# Patient Record
Sex: MALE | Race: WHITE | NOT HISPANIC OR LATINO | Employment: FULL TIME | URBAN - NONMETROPOLITAN AREA
[De-identification: names, ages, dates, MRNs, and addresses within clinical notes are randomized per-mention and may not be internally consistent; named-entity substitution may affect disease eponyms.]

---

## 2022-01-20 ENCOUNTER — APPOINTMENT (OUTPATIENT)
Dept: GENERAL RADIOLOGY | Facility: HOSPITAL | Age: 47
End: 2022-01-20

## 2022-01-20 ENCOUNTER — APPOINTMENT (OUTPATIENT)
Dept: CT IMAGING | Facility: HOSPITAL | Age: 47
End: 2022-01-20

## 2022-01-20 ENCOUNTER — HOSPITAL ENCOUNTER (EMERGENCY)
Facility: HOSPITAL | Age: 47
Discharge: LEFT BEFORE DISCHARGE | End: 2022-01-20
Attending: STUDENT IN AN ORGANIZED HEALTH CARE EDUCATION/TRAINING PROGRAM | Admitting: STUDENT IN AN ORGANIZED HEALTH CARE EDUCATION/TRAINING PROGRAM

## 2022-01-20 VITALS
HEART RATE: 109 BPM | SYSTOLIC BLOOD PRESSURE: 116 MMHG | TEMPERATURE: 98.2 F | OXYGEN SATURATION: 96 % | DIASTOLIC BLOOD PRESSURE: 79 MMHG | RESPIRATION RATE: 22 BRPM

## 2022-01-20 DIAGNOSIS — R07.9 ACUTE CHEST PAIN: ICD-10-CM

## 2022-01-20 DIAGNOSIS — U07.1 COVID-19: Primary | ICD-10-CM

## 2022-01-20 LAB
ALBUMIN SERPL-MCNC: 4.51 G/DL (ref 3.5–5.2)
ALBUMIN/GLOB SERPL: 1.4 G/DL
ALP SERPL-CCNC: 81 U/L (ref 39–117)
ALT SERPL W P-5'-P-CCNC: 22 U/L (ref 1–41)
ANION GAP SERPL CALCULATED.3IONS-SCNC: 12 MMOL/L (ref 5–15)
APTT PPP: 30.7 SECONDS (ref 25.5–35.4)
AST SERPL-CCNC: 24 U/L (ref 1–40)
BASOPHILS # BLD AUTO: 0.03 10*3/MM3 (ref 0–0.2)
BASOPHILS NFR BLD AUTO: 0.6 % (ref 0–1.5)
BILIRUB SERPL-MCNC: 0.5 MG/DL (ref 0–1.2)
BUN SERPL-MCNC: 12 MG/DL (ref 6–20)
BUN/CREAT SERPL: 10.5 (ref 7–25)
CALCIUM SPEC-SCNC: 9.2 MG/DL (ref 8.6–10.5)
CHLORIDE SERPL-SCNC: 101 MMOL/L (ref 98–107)
CO2 SERPL-SCNC: 23 MMOL/L (ref 22–29)
CREAT SERPL-MCNC: 1.14 MG/DL (ref 0.76–1.27)
CRP SERPL-MCNC: 0.46 MG/DL (ref 0–0.5)
D DIMER PPP FEU-MCNC: 0.39 MCGFEU/ML (ref 0–0.5)
DEPRECATED RDW RBC AUTO: 39.5 FL (ref 37–54)
EOSINOPHIL # BLD AUTO: 0.03 10*3/MM3 (ref 0–0.4)
EOSINOPHIL NFR BLD AUTO: 0.6 % (ref 0.3–6.2)
ERYTHROCYTE [DISTWIDTH] IN BLOOD BY AUTOMATED COUNT: 12.2 % (ref 12.3–15.4)
ERYTHROCYTE [SEDIMENTATION RATE] IN BLOOD: 9 MM/HR (ref 0–15)
FERRITIN SERPL-MCNC: 253.2 NG/ML (ref 30–400)
FLUAV SUBTYP SPEC NAA+PROBE: NOT DETECTED
FLUBV RNA ISLT QL NAA+PROBE: NOT DETECTED
GFR SERPL CREATININE-BSD FRML MDRD: 69 ML/MIN/1.73
GLOBULIN UR ELPH-MCNC: 3.2 GM/DL
GLUCOSE SERPL-MCNC: 96 MG/DL (ref 65–99)
HCT VFR BLD AUTO: 44.4 % (ref 37.5–51)
HGB BLD-MCNC: 15.1 G/DL (ref 13–17.7)
IMM GRANULOCYTES # BLD AUTO: 0.02 10*3/MM3 (ref 0–0.05)
IMM GRANULOCYTES NFR BLD AUTO: 0.4 % (ref 0–0.5)
INR PPP: 1.02 (ref 0.9–1.1)
LDH SERPL-CCNC: 170 U/L (ref 135–225)
LYMPHOCYTES # BLD AUTO: 0.38 10*3/MM3 (ref 0.7–3.1)
LYMPHOCYTES NFR BLD AUTO: 7.1 % (ref 19.6–45.3)
MAGNESIUM SERPL-MCNC: 1.8 MG/DL (ref 1.6–2.6)
MCH RBC QN AUTO: 30.2 PG (ref 26.6–33)
MCHC RBC AUTO-ENTMCNC: 34 G/DL (ref 31.5–35.7)
MCV RBC AUTO: 88.8 FL (ref 79–97)
MONOCYTES # BLD AUTO: 0.81 10*3/MM3 (ref 0.1–0.9)
MONOCYTES NFR BLD AUTO: 15.2 % (ref 5–12)
NEUTROPHILS NFR BLD AUTO: 4.05 10*3/MM3 (ref 1.7–7)
NEUTROPHILS NFR BLD AUTO: 76.1 % (ref 42.7–76)
NRBC BLD AUTO-RTO: 0 /100 WBC (ref 0–0.2)
NT-PROBNP SERPL-MCNC: 21 PG/ML (ref 0–450)
PLATELET # BLD AUTO: 169 10*3/MM3 (ref 140–450)
PMV BLD AUTO: 10.6 FL (ref 6–12)
POTASSIUM SERPL-SCNC: 4 MMOL/L (ref 3.5–5.2)
PROT SERPL-MCNC: 7.7 G/DL (ref 6–8.5)
PROTHROMBIN TIME: 13.8 SECONDS (ref 12.8–14.5)
QT INTERVAL: 304 MS
QTC INTERVAL: 431 MS
RBC # BLD AUTO: 5 10*6/MM3 (ref 4.14–5.8)
SARS-COV-2 RNA PNL SPEC NAA+PROBE: DETECTED
SODIUM SERPL-SCNC: 136 MMOL/L (ref 136–145)
T4 FREE SERPL-MCNC: 1.13 NG/DL (ref 0.93–1.7)
TROPONIN T SERPL-MCNC: <0.01 NG/ML (ref 0–0.03)
TROPONIN T SERPL-MCNC: <0.01 NG/ML (ref 0–0.03)
TSH SERPL DL<=0.05 MIU/L-ACNC: 1.06 UIU/ML (ref 0.27–4.2)
WBC NRBC COR # BLD: 5.32 10*3/MM3 (ref 3.4–10.8)

## 2022-01-20 PROCEDURE — 70450 CT HEAD/BRAIN W/O DYE: CPT

## 2022-01-20 PROCEDURE — 86140 C-REACTIVE PROTEIN: CPT | Performed by: STUDENT IN AN ORGANIZED HEALTH CARE EDUCATION/TRAINING PROGRAM

## 2022-01-20 PROCEDURE — 82728 ASSAY OF FERRITIN: CPT | Performed by: STUDENT IN AN ORGANIZED HEALTH CARE EDUCATION/TRAINING PROGRAM

## 2022-01-20 PROCEDURE — 80050 GENERAL HEALTH PANEL: CPT | Performed by: STUDENT IN AN ORGANIZED HEALTH CARE EDUCATION/TRAINING PROGRAM

## 2022-01-20 PROCEDURE — 25010000002 DIPHENHYDRAMINE PER 50 MG: Performed by: STUDENT IN AN ORGANIZED HEALTH CARE EDUCATION/TRAINING PROGRAM

## 2022-01-20 PROCEDURE — 96374 THER/PROPH/DIAG INJ IV PUSH: CPT

## 2022-01-20 PROCEDURE — 0 IOPAMIDOL PER 1 ML: Performed by: STUDENT IN AN ORGANIZED HEALTH CARE EDUCATION/TRAINING PROGRAM

## 2022-01-20 PROCEDURE — 87636 SARSCOV2 & INF A&B AMP PRB: CPT | Performed by: STUDENT IN AN ORGANIZED HEALTH CARE EDUCATION/TRAINING PROGRAM

## 2022-01-20 PROCEDURE — 96375 TX/PRO/DX INJ NEW DRUG ADDON: CPT

## 2022-01-20 PROCEDURE — 85379 FIBRIN DEGRADATION QUANT: CPT | Performed by: STUDENT IN AN ORGANIZED HEALTH CARE EDUCATION/TRAINING PROGRAM

## 2022-01-20 PROCEDURE — 85652 RBC SED RATE AUTOMATED: CPT | Performed by: STUDENT IN AN ORGANIZED HEALTH CARE EDUCATION/TRAINING PROGRAM

## 2022-01-20 PROCEDURE — 85730 THROMBOPLASTIN TIME PARTIAL: CPT | Performed by: STUDENT IN AN ORGANIZED HEALTH CARE EDUCATION/TRAINING PROGRAM

## 2022-01-20 PROCEDURE — 84484 ASSAY OF TROPONIN QUANT: CPT | Performed by: STUDENT IN AN ORGANIZED HEALTH CARE EDUCATION/TRAINING PROGRAM

## 2022-01-20 PROCEDURE — 93005 ELECTROCARDIOGRAM TRACING: CPT | Performed by: STUDENT IN AN ORGANIZED HEALTH CARE EDUCATION/TRAINING PROGRAM

## 2022-01-20 PROCEDURE — 71045 X-RAY EXAM CHEST 1 VIEW: CPT

## 2022-01-20 PROCEDURE — 83615 LACTATE (LD) (LDH) ENZYME: CPT | Performed by: STUDENT IN AN ORGANIZED HEALTH CARE EDUCATION/TRAINING PROGRAM

## 2022-01-20 PROCEDURE — 83735 ASSAY OF MAGNESIUM: CPT | Performed by: STUDENT IN AN ORGANIZED HEALTH CARE EDUCATION/TRAINING PROGRAM

## 2022-01-20 PROCEDURE — 85610 PROTHROMBIN TIME: CPT | Performed by: STUDENT IN AN ORGANIZED HEALTH CARE EDUCATION/TRAINING PROGRAM

## 2022-01-20 PROCEDURE — 84439 ASSAY OF FREE THYROXINE: CPT | Performed by: STUDENT IN AN ORGANIZED HEALTH CARE EDUCATION/TRAINING PROGRAM

## 2022-01-20 PROCEDURE — 83880 ASSAY OF NATRIURETIC PEPTIDE: CPT | Performed by: STUDENT IN AN ORGANIZED HEALTH CARE EDUCATION/TRAINING PROGRAM

## 2022-01-20 PROCEDURE — 99283 EMERGENCY DEPT VISIT LOW MDM: CPT

## 2022-01-20 PROCEDURE — 71275 CT ANGIOGRAPHY CHEST: CPT

## 2022-01-20 RX ORDER — LABETALOL HYDROCHLORIDE 5 MG/ML
20 INJECTION, SOLUTION INTRAVENOUS ONCE
Status: COMPLETED | OUTPATIENT
Start: 2022-01-20 | End: 2022-01-20

## 2022-01-20 RX ORDER — DIPHENHYDRAMINE HYDROCHLORIDE 50 MG/ML
25 INJECTION INTRAMUSCULAR; INTRAVENOUS ONCE
Status: COMPLETED | OUTPATIENT
Start: 2022-01-20 | End: 2022-01-20

## 2022-01-20 RX ORDER — PROCHLORPERAZINE EDISYLATE 5 MG/ML
5 INJECTION INTRAMUSCULAR; INTRAVENOUS EVERY 6 HOURS PRN
Status: DISCONTINUED | OUTPATIENT
Start: 2022-01-20 | End: 2022-01-20 | Stop reason: HOSPADM

## 2022-01-20 RX ORDER — ASPIRIN 81 MG/1
324 TABLET, CHEWABLE ORAL ONCE
Status: COMPLETED | OUTPATIENT
Start: 2022-01-20 | End: 2022-01-20

## 2022-01-20 RX ORDER — NITROGLYCERIN 0.4 MG/1
0.4 TABLET SUBLINGUAL
Status: DISCONTINUED | OUTPATIENT
Start: 2022-01-20 | End: 2022-01-20 | Stop reason: HOSPADM

## 2022-01-20 RX ORDER — DIPHENHYDRAMINE HYDROCHLORIDE 50 MG/ML
25 INJECTION INTRAMUSCULAR; INTRAVENOUS ONCE
Status: DISCONTINUED | OUTPATIENT
Start: 2022-01-20 | End: 2022-01-20 | Stop reason: HOSPADM

## 2022-01-20 RX ORDER — PROCHLORPERAZINE EDISYLATE 5 MG/ML
5 INJECTION INTRAMUSCULAR; INTRAVENOUS EVERY 6 HOURS PRN
Status: DISCONTINUED | OUTPATIENT
Start: 2022-01-20 | End: 2022-01-20 | Stop reason: SDUPTHER

## 2022-01-20 RX ORDER — ACETAMINOPHEN 500 MG
1000 TABLET ORAL ONCE
Status: COMPLETED | OUTPATIENT
Start: 2022-01-20 | End: 2022-01-20

## 2022-01-20 RX ADMIN — ASPIRIN 324 MG: 81 TABLET, CHEWABLE ORAL at 00:42

## 2022-01-20 RX ADMIN — DIPHENHYDRAMINE HYDROCHLORIDE 25 MG: 50 INJECTION INTRAMUSCULAR; INTRAVENOUS at 03:29

## 2022-01-20 RX ADMIN — ACETAMINOPHEN 1000 MG: 500 TABLET ORAL at 03:25

## 2022-01-20 RX ADMIN — NITROGLYCERIN 2 INCH: 20 OINTMENT TOPICAL at 00:42

## 2022-01-20 RX ADMIN — IOPAMIDOL 80 ML: 755 INJECTION, SOLUTION INTRAVENOUS at 02:01

## 2022-01-20 RX ADMIN — LABETALOL HYDROCHLORIDE 10 MG: 5 INJECTION INTRAVENOUS at 01:45

## 2022-01-20 NOTE — ED NOTES
"Pt states that since he cannot have visitors upstairs later with the COVID, he would like to leave and go home. Explained to pt the risks and benefits of staying vs going home. Pt states he would still like to go home and \"just have the stress tests scheduled for later\". Pt A&O X4. VSS. RR even and unlabored. Dr Arcos and Lead RN, Ivy taylor.      Rubi Howe, RN  01/20/22 0446    "

## 2022-01-20 NOTE — ED NOTES
"Pt states he would like to leave AMA, pt states he just wants to go home and rest in his own bed and have the stress test \"scheduled for later\". Dr Sibley speaks with pt about the risks and benefits of staying vs leaving. Pt decided to stay and continue treatment.      Rubi Howe, RN  01/20/22 0449    "

## 2022-01-20 NOTE — ED PROVIDER NOTES
[facility]  emergency department encounter        Pt Name: Hussein Alcantara  MRN: [id number]  Birthdate 1975  Date of evaluation: 1/21/2022    Chief Complaint   Patient presents with   • Chest Pain             HISTORY OF PRESENT ILLNESS:   Hussein Alcantara is a 46 y.o. male PMH HTN, HLD, unspecified childhood aortic heart valve abnormality he reports his worsened and adulthood.  Patient had prior cardiologist but stopped following up, has not taken any medications in years.    Patient presents for acute onset chest pain a couple hours prior to arrival.  Described as pressure radiating throughout his chest, constant, exacerbated with exertion, associated shortness of breath, associated nausea with episode of vomiting, diaphoretic and tachycardic on arrival.  Endorses headache.  Patient otherwise broadly denies complaints.    Reports remote history of catheterization and stress test estimated 5 years ago, no reported abnormalities.  No stents.      No other exacerbating or alleviating factors other than as noted above.  Severity: Severe    PCP: Provider, No Known          REVIEW OF SYSTEMS:     Review of Systems   Constitutional: Positive for diaphoresis. Negative for fever.   HENT: Negative for congestion and rhinorrhea.    Eyes: Negative for visual disturbance.   Respiratory: Positive for shortness of breath. Negative for cough.    Cardiovascular: Positive for chest pain.   Gastrointestinal: Positive for nausea and vomiting. Negative for abdominal pain.   Genitourinary: Negative for dysuria.   Musculoskeletal: Negative for myalgias.   Skin: Negative for rash.   Neurological: Positive for headaches.   Psychiatric/Behavioral: Negative for confusion.       Review of systems otherwise as per HPI.          PREVIOUS HISTORY:       No past medical history on file.      No past surgical history on file.        Social History     Socioeconomic History   • Marital status: Single         No family history on  file.      No current outpatient medications      Allergies:  Patient has no known allergies.    Medications, allergies and past medical, surgical, family, and social history reviewed.        PHYSICAL EXAM:     Physical Exam  Vitals and nursing note reviewed.   Constitutional:       General: He is not in acute distress.     Appearance: Normal appearance. He is ill-appearing and diaphoretic. He is not toxic-appearing.   HENT:      Head: Normocephalic and atraumatic.   Eyes:      Extraocular Movements: Extraocular movements intact.      Conjunctiva/sclera: Conjunctivae normal.   Cardiovascular:      Rate and Rhythm: Regular rhythm. Tachycardia present.   Pulmonary:      Effort: Pulmonary effort is normal. No respiratory distress.      Breath sounds: No stridor. No wheezing, rhonchi or rales.   Abdominal:      General: Abdomen is flat. There is no distension.   Musculoskeletal:         General: No deformity. Normal range of motion.      Cervical back: Normal range of motion. No rigidity.   Neurological:      General: No focal deficit present.      Mental Status: He is alert and oriented to person, place, and time.   Psychiatric:         Mood and Affect: Mood normal.         Behavior: Behavior normal.             COMPLETED DIAGNOSTIC STUDIES AND INTERVENTIONS:     Lab Results (last 24 hours)     ** No results found for the last 24 hours. **            CT Angiogram Chest   Final Result      1. Suboptimal opacification of the pulmonary arteries. No large filling defects in the first or second order pulmonary arteries.   2. Negative for thoracic aortic aneurysm/dissection.   3. No acute pulmonary process.      Signer Name: Fredy Rojas MD    Signed: 1/20/2022 2:19 AM    Workstation Name: [email or web address]     [facility]      CT Head Without Contrast   Final Result   No acute intracranial abnormality.               Signer Name: Fredy Rojas MD    Signed: 1/20/2022 2:16 AM    Workstation Name:  [facility]     [facility]      XR Chest 1 View   Final Result      1. Low lung volumes.   2. Borderline heart size with mild central vascular congestion. No brenda pulmonary edema, but correlation for any symptoms of congestive failure recommended.      Signer Name: Fredy Rojas MD    Signed: 1/20/2022 12:58 AM    [facility]: [facility]     [facility]            New Medications Ordered This Visit   Medications   • aspirin chewable tablet 324 mg   • nitroglycerin (NITROSTAT) ointment 2 inch   • labetalol (NORMODYNE,TRANDATE) injection 20 mg   • iopamidol (ISOVUE-370) 76 % injection 100 mL   • acetaminophen (TYLENOL) tablet 1,000 mg   • diphenhydrAMINE (BENADRYL) injection 25 mg         Procedures            MEDICAL DECISION-MAKING AND ED COURSE:     ED Course as of 01/21/22 0845   Thu Jan 20, 2022   0021 EKG at 0012 hours a.m. sinus tachycardia 121 bpm, , QRS 92, QTc 431, regular axis, Q waves in V2, V3, poor R wave progression.  No prior for comparison.  No ST deviation or significant T wave abnormalities concerning for acute ischemia. [KP]   0128 MDM: 46-year-old male with unspecified aortic valve abnormality, HTN, HLD, noncompliant with medications and follow-up presents complaining of chest pain highly concerning for ACS.  Patient brought back to room emergently, administered aspirin, nitro, start fluid bolus.  EKG no evidence of ischemia. [KP]   0136 XR Chest 1 View  IMPRESSION:     1. Low lung volumes.  2. Borderline heart size with mild central vascular congestion. No bernda pulmonary edema, but correlation for any symptoms of congestive failure recommended. [KP]   0159 Troponin T: <0.010  Heart score 5 [KP]   0159 COVID19(!!): Detected [KP]   0159 WBC: 5.32 [KP]   0159 C-Reactive Protein: 0.46 [KP]   0159 Inflammatory markers reassuring, low concern for bacterial section [KP]   0159 Sed Rate: 9 [KP]   0200 Handoff to Dr. Arcos: 46-year-old  male PMH HTN, HLD, aortic valve disease, noncompliant with medications and follow-up presents with acute onset chest pain/pressure couple hours prior to arrival, highly concerning for ACS, associated shortness of breath, nausea, vomiting, diaphoretic on arrival, tachycardic.  +Headache.  No other respiratory complaints, COVID positive.  Heart score 5.  Echo ordered for the morning.  Final troponin 5 AM, no chest pain improved recommend stress test prior to discharge. [KP]   0228 D-Dimer, Quant: 0.39 [KP]   0229 CT head, CTA chest no acute findings. [KP]   0250 Complains of headache, Compazine Benadryl ordered.  Updated patient on plans. [KP]   Fri Jan 21, 2022   0839 Troponin T: <0.010 [KP]   0840 Pt discharged AMA by overnight provider. I held multiple discussions earlier in the night prior to ending my shift. Pt had clear understanding that his chest pain could very likely be from heart issues and that he was high risk for a heart attack. He expressed appreciation for the possible risk of leaving AMA including death. Pt clearly and consistently expressed his choice, reasoning, and understanding during those discussions. [KP]      ED Course User Index  [KP] Terry Sibley MD       ?      FINAL IMPRESSION:       1. COVID-19    2. Acute chest pain         The complaints listed here are new problems to this examiner.      FOLLOW-UP  No follow-up provider specified.    DISPOSITION  ED Disposition     ED Disposition Condition Comment    AMA                  This care is provided during an unprecedented national emergency due to the Novel Coronavirus (COVID-19). COVID-19 infections and transmission risks place heavy strains on healthcare resources. As this pandemic evolves, the Hospital and providers strive to respond fluidly, to remain operational, and to provide care relative to available resources and information. Outcomes are unpredictable and treatments are without well-defined guidelines. Further, the impact of  COVID-19 on all aspects of emergency care, including the impact to patients seeking care for reasons other than COVID-19, is unavoidable during this national emergency.    This note was dictated using a [id number] tool. Occasional wrong-word or 'sound-a-like' substitutions may have occurred due to the inherent limitations of voice recognition software. ?Read the chart carefully and recognize, using context, where substitutions have occurred.    Terry Sibley MD  08:44 EST  1/21/2022             Terry Sibley MD  01/21/22 0845

## 2022-01-20 NOTE — ED NOTES
"Pt states he would like to leave AMA, pt states he just wants to go home and rest in his own bed and have the stress test \"scheduled for later\". Dr Sibley speaks with pt about the risks and benefits of staying vs leaving. Pt decided to stay and continue treatment.      Rubi Howe, RN  01/20/22 0448    "

## 2022-03-14 ENCOUNTER — TRANSCRIBE ORDERS (OUTPATIENT)
Dept: ADMINISTRATIVE | Facility: HOSPITAL | Age: 47
End: 2022-03-14

## 2022-03-14 ENCOUNTER — LAB (OUTPATIENT)
Dept: LAB | Facility: HOSPITAL | Age: 47
End: 2022-03-14

## 2022-03-14 DIAGNOSIS — N46.9 INFERTILITY MALE: ICD-10-CM

## 2022-03-14 DIAGNOSIS — N46.9 INFERTILITY MALE: Primary | ICD-10-CM

## 2022-03-14 LAB
CHARACTER SMN: ABNORMAL
COLOR SMN: ABNORMAL
FORWARD PROGRESSION: ABNORMAL
ROUND CELLS, SEMINAL FLUID: ABNORMAL /HPF
SPECIMEN VOL SMN: 1.5 ML (ref 2–5)
SPERM # SMN: 7.4 MILLIONS/ML (ref 20–999)
SPERM MOTILE NFR SMN: 7 % MOTILE (ref 41–100)
SPERM PRECURSORS/100 SPERM: 3 /100 SPERM
SPERM SMN: 18 % NORMAL (ref 30–100)
VIABLE CELLS NFR SPEC: 4 % VIABLE (ref 51–100)
VISC SMN: NORMAL CP
WBC/100 SPERM: 2 /100 SPERM

## 2022-03-14 PROCEDURE — 89320 SEMEN ANAL VOL/COUNT/MOT: CPT

## 2022-05-24 ENCOUNTER — OFFICE VISIT (OUTPATIENT)
Dept: UROLOGY | Facility: CLINIC | Age: 47
End: 2022-05-24

## 2022-05-24 VITALS — HEIGHT: 70 IN | BODY MASS INDEX: 35.79 KG/M2 | WEIGHT: 250 LBS

## 2022-05-24 DIAGNOSIS — N40.0 BENIGN PROSTATIC HYPERPLASIA WITHOUT LOWER URINARY TRACT SYMPTOMS: Primary | ICD-10-CM

## 2022-05-24 PROCEDURE — 99203 OFFICE O/P NEW LOW 30 MIN: CPT | Performed by: UROLOGY

## 2022-05-24 RX ORDER — LEVOFLOXACIN 500 MG/1
TABLET, FILM COATED ORAL
COMMUNITY
Start: 2022-04-20

## 2022-05-24 RX ORDER — ATORVASTATIN CALCIUM 10 MG/1
TABLET, FILM COATED ORAL
COMMUNITY
Start: 2022-04-20

## 2022-05-24 RX ORDER — LISINOPRIL 20 MG/1
20 TABLET ORAL
COMMUNITY
Start: 2022-04-20

## 2022-05-24 RX ORDER — TAMSULOSIN HYDROCHLORIDE 0.4 MG/1
1 CAPSULE ORAL NIGHTLY
Qty: 30 CAPSULE | Refills: 5 | Status: SHIPPED | OUTPATIENT
Start: 2022-05-24 | End: 2022-07-25 | Stop reason: SDUPTHER

## 2022-05-24 NOTE — PROGRESS NOTES
Chief Complaint:          Chief Complaint   Patient presents with   • Testicle Pain     New patient        HPI:   47 y.o. male referred for evaluation of perineal/testicular pain.  He is accompanied by his wife.  He has testalgia.  Psych sitting on an egg but not at the head of the penis he reports no lower urinary tract symptomatology, particularly irritative symptoms such as frequency, urgency, dysuria, and obstructive symptomatology, particularly dribbling, hesitancy, and intermittency.  He was treated with an antibiotic for 7 days without effects.  He had a negative ultrasound is worse in the morning he has a poor stream.  He has an uncircumcised phallus with less than 10 g prostate he has had a cholecystectomy.  Exam is entirely unremarkable including a digital rectal examination showing a small prostate and no prostatitis I Dot start him on alpha blockade see him back in 2 months      Past Medical History:      History reviewed. No pertinent past medical history.      Current Meds:     Current Outpatient Medications   Medication Sig Dispense Refill   • atorvastatin (LIPITOR) 10 MG tablet      • levoFLOXacin (LEVAQUIN) 500 MG tablet      • lisinopril (PRINIVIL,ZESTRIL) 20 MG tablet 20 mg.       No current facility-administered medications for this visit.        Allergies:      No Known Allergies     Past Surgical History:     History reviewed. No pertinent surgical history.      Social History:     Social History     Socioeconomic History   • Marital status:        Family History:     History reviewed. No pertinent family history.    Review of Systems:     Review of Systems   Constitutional: Negative.    HENT: Negative.    Eyes: Negative.    Respiratory: Negative.    Cardiovascular: Negative.    Gastrointestinal: Negative.    Endocrine: Negative.    Genitourinary: Positive for difficulty urinating, frequency and testicular pain.   Musculoskeletal: Negative.    Allergic/Immunologic: Negative.     Neurological: Negative.    Hematological: Negative.    Psychiatric/Behavioral: Negative.        Physical Exam:     Physical Exam  Vitals and nursing note reviewed.   Constitutional:       Appearance: He is well-developed.   HENT:      Head: Normocephalic and atraumatic.   Eyes:      Conjunctiva/sclera: Conjunctivae normal.      Pupils: Pupils are equal, round, and reactive to light.   Cardiovascular:      Rate and Rhythm: Normal rate and regular rhythm.      Heart sounds: Normal heart sounds.   Pulmonary:      Effort: Pulmonary effort is normal.      Breath sounds: Normal breath sounds.   Abdominal:      General: Bowel sounds are normal.      Palpations: Abdomen is soft.   Genitourinary:     Comments: Obese normal phallus bilateral descended testes without masses good rectal tone and a small smooth firm prostate  Musculoskeletal:         General: Normal range of motion.      Cervical back: Normal range of motion.   Skin:     General: Skin is warm and dry.   Neurological:      Mental Status: He is alert and oriented to person, place, and time.      Deep Tendon Reflexes: Reflexes are normal and symmetric.   Psychiatric:         Behavior: Behavior normal.         Thought Content: Thought content normal.         Judgment: Judgment normal.         I have reviewed the following portions of the patient's history: Allergies, current medications, past family history, past medical history, past social history, past surgical history, problem list, and ROS and confirm it is accurate.      Procedure:       Assessment/Plan:   BPH: Discussed the pathophysiology of BPH and obstruction.  We discussed the static and dynamic effects of BPH as well as using 5 alpha reductase inhibitors versus alpha blockade.  We discussed the indications for transurethral surgery as well and/ or other therapeutic options available including all of the newer techniques.  Has significant voiding dysfunction I Dot try him on a course of Flomax  His  prostate is normal to palpation very small I do not think this is probably related to prostatitis but more related to back pain with referred testalgia          This document has been electronically signed by SHAHIDA TALBOT MD May 24, 2022 13:17 EDT

## 2022-05-25 PROBLEM — N40.0 BENIGN PROSTATIC HYPERPLASIA WITHOUT LOWER URINARY TRACT SYMPTOMS: Status: ACTIVE | Noted: 2022-05-25

## 2022-07-25 ENCOUNTER — OFFICE VISIT (OUTPATIENT)
Dept: UROLOGY | Facility: CLINIC | Age: 47
End: 2022-07-25

## 2022-07-25 VITALS — BODY MASS INDEX: 35.66 KG/M2 | WEIGHT: 249.12 LBS | HEIGHT: 70 IN

## 2022-07-25 DIAGNOSIS — N40.0 BENIGN PROSTATIC HYPERPLASIA WITHOUT LOWER URINARY TRACT SYMPTOMS: ICD-10-CM

## 2022-07-25 PROCEDURE — 99213 OFFICE O/P EST LOW 20 MIN: CPT | Performed by: UROLOGY

## 2022-07-25 RX ORDER — TADALAFIL 5 MG/1
5 TABLET ORAL DAILY
Qty: 30 TABLET | Refills: 6 | Status: SHIPPED | OUTPATIENT
Start: 2022-07-25

## 2022-07-25 RX ORDER — TAMSULOSIN HYDROCHLORIDE 0.4 MG/1
1 CAPSULE ORAL NIGHTLY
Qty: 90 CAPSULE | Refills: 5 | Status: SHIPPED | OUTPATIENT
Start: 2022-07-25 | End: 2023-01-26 | Stop reason: SDUPTHER

## 2022-07-25 NOTE — PROGRESS NOTES
Chief Complaint:      Chief Complaint   Patient presents with   • Benign Prostatic Hypertrophy       HPI:   47 y.o. male returns today.  He does not hurt as bad as he did so his prostatitis is doing better.  He is on alpha blockade.  It does somewhat adversely affect his ejaculatory function.  I will make the addition of Cialis because his erections are not as good as what they were we discussed aggressive options but given his age of 47 I do not recommend aggressive intervention I will see him back in 6 months    Past Medical History:     Past Medical History:   Diagnosis Date   • High blood pressure        Current Meds:     Current Outpatient Medications   Medication Sig Dispense Refill   • atorvastatin (LIPITOR) 10 MG tablet      • levoFLOXacin (LEVAQUIN) 500 MG tablet      • lisinopril (PRINIVIL,ZESTRIL) 20 MG tablet 20 mg.     • tamsulosin (Flomax) 0.4 MG capsule 24 hr capsule Take 1 capsule by mouth Every Night. 30 capsule 5     No current facility-administered medications for this visit.        Allergies:      No Known Allergies     Past Surgical History:     Past Surgical History:   Procedure Laterality Date   • CHOLECYSTECTOMY     • TONSILLECTOMY         Social History:     Social History     Socioeconomic History   • Marital status:    Tobacco Use   • Smoking status: Never Smoker   • Smokeless tobacco: Never Used   Substance and Sexual Activity   • Alcohol use: Not Currently   • Drug use: Never   • Sexual activity: Defer       Family History:     Family History   Problem Relation Age of Onset   • Heart disease Father    • Hypertension Father    • Hypertension Mother    • Heart disease Mother        Review of Systems:     Review of Systems   Constitutional: Negative.    HENT: Negative.    Eyes: Negative.    Respiratory: Negative.    Cardiovascular: Negative.    Gastrointestinal: Negative.    Endocrine: Negative.    Musculoskeletal: Negative.    Allergic/Immunologic: Negative.    Neurological:  Negative.    Hematological: Negative.    Psychiatric/Behavioral: Negative.        Physical Exam:     Physical Exam  Vitals and nursing note reviewed.   Constitutional:       Appearance: He is well-developed.   HENT:      Head: Normocephalic and atraumatic.   Eyes:      Conjunctiva/sclera: Conjunctivae normal.      Pupils: Pupils are equal, round, and reactive to light.   Cardiovascular:      Rate and Rhythm: Normal rate and regular rhythm.      Heart sounds: Normal heart sounds.   Pulmonary:      Effort: Pulmonary effort is normal.      Breath sounds: Normal breath sounds.   Abdominal:      General: Bowel sounds are normal.      Palpations: Abdomen is soft.   Musculoskeletal:         General: Normal range of motion.      Cervical back: Normal range of motion.   Skin:     General: Skin is warm and dry.   Neurological:      Mental Status: He is alert and oriented to person, place, and time.      Deep Tendon Reflexes: Reflexes are normal and symmetric.   Psychiatric:         Behavior: Behavior normal.         Thought Content: Thought content normal.         Judgment: Judgment normal.         I have reviewed the following portions of the patient's history: Allergies, current medications, past family history, past medical history, past social history, past surgical history, problem list, and ROS and confirm it is accurate.    Recent Image (CT and/or KUB):      CT Abdomen and Pelvis: No results found for this or any previous visit.       CT Stone Protocol: No results found for this or any previous visit.       KUB: No results found for this or any previous visit.       Labs (past 3 months):      No visits with results within 3 Month(s) from this visit.   Latest known visit with results is:   Lab on 03/14/2022   Component Date Value Ref Range Status   • Semen Color 03/14/2022 Straw (A) White, Gray Final   • Volume, Semen 03/14/2022 1.5 (A) 2.0 - 5.0 mL Final   • Viscosity 03/14/2022 Normal  Normal Final   • Motility  03/14/2022 7 (A) 41 - 100 % Motile Final   • Forward Progression 03/14/2022 1.0 (Abnormal) Some of the sperm are motile but none are showing any signs of forward motion.   Final   • Viability 03/14/2022 4 (A) 51 - 100 % Viable Final   • Sperm Morphology 03/14/2022 18.0 (A) 30.0 - 100.0 % Normal Final   • Sperm, Total Count 03/14/2022 7.4 (A) 20.0 - 999.0 Millions/mL Final   • Round Cells, Seminal Fluid 03/14/2022 0-7  0 - 7 /HPF Final   • WBC/100 sperm 03/14/2022 2  /100 Sperm Final   • Sperm Precursors/100 sperm 03/14/2022 3  /100 Sperm Final   • Character, Semen 03/14/2022 Translucent but turbid  Translucent but turbid Final        Procedure:       Assessment/Plan:   Prostatitis-we discussed the differential diagnosis of prostatitis including the National Institutes of Health classification system I through IV.  I discussed that type I prostatitis is acute bacterial prostatitis and associated with high fevers, typically hematuria, and severe pain with voiding.  We discussed the fact that it necessitates 6 weeks of chronic antibiotics to be sure it doesn't turn into a chronic bacterial prostatitis that can have lifelong ramifications.  We discussed National Institutes of Health type II chronic bacterial prostatitis.  We discussed the fact that this a chronic bacterial infection of the prostate that often requires suppressive antibiotics.  We discussed type III being related more to nonspecific urethritis, as well as tight for being related to finding inflammation and a biopsy in the absence of symptomatology.  I discussed the diagnostic strategies including the VB 1 through 4 urine culture and discussed empiric therapy.  I emphasized that with the use of chronic antibiotics, I strongly recommended the concomitant use of probiotics.  Additionally, we discussed the various antibiotics used and the risks and benefits associated with each, particularly the use of long-term ciprofloxacin and the effect on joints and  collagen in human beings.  His prostatitis has resolved  BPH: Discussed the pathophysiology of BPH and obstruction.  We discussed the static and dynamic effects of BPH as well as using 5 alpha reductase inhibitors versus alpha blockade.  We discussed the indications for transurethral surgery as well and/ or other therapeutic options available including all of the newer techniques.  Because of a somewhat slow stream I will make the addition of Cialis to his regimen of alpha blockade.  I do not think aggressive surgical intervention is indicated at this juncture given his young age  Erectile dysfunction-we discussed the anatomy and physiology of the penis and the endothelium.  We discussed the various forms of erectile dysfunction including peripheral vascular occlusive disease, postoperative, secondary to radiation treatments of the prostate, and arterial inflow.  We discussed the various treatment options available including oral medication and its various forms.  We discussed the use of both generic and non-generic Viagra.  We discussed Cialis and a longer half-life of 17 hours as well as the other 2 medications.  We discussed cost involved with this including the fact that the generic is much cheaper but is taken as multiple pills because they are 20 mg dosages.  We did discuss the other alternatives including penile injections, vacuum erection devices and surgical intervention reserved for only the most severe cases.  We discussed the need for testosterone in about 20% of cases of erectile dysfunction.  We will make the addition of Cialis as well              This document has been electronically signed by SHAHIDA TALBOT MD July 25, 2022 12:58 EDT    Dictated Utilizing Dragon Dictation: Part of this note may be an electronic transcription/translation of spoken language to printed text using the Dragon Dictation System.

## 2022-07-29 ENCOUNTER — TELEPHONE (OUTPATIENT)
Dept: UROLOGY | Facility: CLINIC | Age: 47
End: 2022-07-29

## 2023-01-26 ENCOUNTER — OFFICE VISIT (OUTPATIENT)
Dept: UROLOGY | Facility: CLINIC | Age: 48
End: 2023-01-26
Payer: COMMERCIAL

## 2023-01-26 VITALS
WEIGHT: 249 LBS | BODY MASS INDEX: 35.65 KG/M2 | HEIGHT: 70 IN | HEART RATE: 82 BPM | DIASTOLIC BLOOD PRESSURE: 102 MMHG | SYSTOLIC BLOOD PRESSURE: 172 MMHG

## 2023-01-26 DIAGNOSIS — N40.0 BENIGN PROSTATIC HYPERPLASIA WITHOUT LOWER URINARY TRACT SYMPTOMS: ICD-10-CM

## 2023-01-26 PROCEDURE — 82670 ASSAY OF TOTAL ESTRADIOL: CPT | Performed by: UROLOGY

## 2023-01-26 PROCEDURE — 99213 OFFICE O/P EST LOW 20 MIN: CPT | Performed by: UROLOGY

## 2023-01-26 PROCEDURE — 84403 ASSAY OF TOTAL TESTOSTERONE: CPT | Performed by: UROLOGY

## 2023-01-26 PROCEDURE — 85027 COMPLETE CBC AUTOMATED: CPT | Performed by: UROLOGY

## 2023-01-26 PROCEDURE — 84153 ASSAY OF PSA TOTAL: CPT | Performed by: UROLOGY

## 2023-01-26 RX ORDER — TADALAFIL 20 MG/1
20 TABLET ORAL AS NEEDED
Qty: 20 TABLET | Refills: 1 | Status: SHIPPED | OUTPATIENT
Start: 2023-01-26

## 2023-01-26 RX ORDER — TAMSULOSIN HYDROCHLORIDE 0.4 MG/1
1 CAPSULE ORAL NIGHTLY
Qty: 90 CAPSULE | Refills: 5 | Status: SHIPPED | OUTPATIENT
Start: 2023-01-26

## 2023-01-27 LAB
DEPRECATED RDW RBC AUTO: 38.6 FL (ref 37–54)
ERYTHROCYTE [DISTWIDTH] IN BLOOD BY AUTOMATED COUNT: 11.8 % (ref 12.3–15.4)
ESTRADIOL SERPL HS-MCNC: 36.7 PG/ML
HCT VFR BLD AUTO: 46.3 % (ref 37.5–51)
HGB BLD-MCNC: 16 G/DL (ref 13–17.7)
MCH RBC QN AUTO: 30.5 PG (ref 26.6–33)
MCHC RBC AUTO-ENTMCNC: 34.6 G/DL (ref 31.5–35.7)
MCV RBC AUTO: 88.2 FL (ref 79–97)
PLATELET # BLD AUTO: 200 10*3/MM3 (ref 140–450)
PMV BLD AUTO: 12.2 FL (ref 6–12)
PSA SERPL-MCNC: 1.11 NG/ML (ref 0–4)
RBC # BLD AUTO: 5.25 10*6/MM3 (ref 4.14–5.8)
TESTOST SERPL-MCNC: 208 NG/DL (ref 249–836)
WBC NRBC COR # BLD: 5.81 10*3/MM3 (ref 3.4–10.8)

## 2023-12-06 ENCOUNTER — TELEPHONE (OUTPATIENT)
Dept: GASTROENTEROLOGY | Facility: CLINIC | Age: 48
End: 2023-12-06
Payer: COMMERCIAL

## 2023-12-06 DIAGNOSIS — N40.0 BENIGN PROSTATIC HYPERPLASIA WITHOUT LOWER URINARY TRACT SYMPTOMS: ICD-10-CM

## 2023-12-06 RX ORDER — TADALAFIL 20 MG/1
20 TABLET ORAL AS NEEDED
Qty: 20 TABLET | Refills: 6 | Status: SHIPPED | OUTPATIENT
Start: 2023-12-06

## 2023-12-06 NOTE — TELEPHONE ENCOUNTER
Pharmacy: [facility] in [place]    Patient called in requesting refill on tadalafil to be sent to above pharmacy.

## 2024-01-17 ENCOUNTER — APPOINTMENT (OUTPATIENT)
Dept: CT IMAGING | Facility: HOSPITAL | Age: 49
End: 2024-01-17
Payer: COMMERCIAL

## 2024-01-17 ENCOUNTER — HOSPITAL ENCOUNTER (EMERGENCY)
Facility: HOSPITAL | Age: 49
Discharge: HOME | End: 2024-01-17
Attending: EMERGENCY MEDICINE | Admitting: EMERGENCY MEDICINE
Payer: COMMERCIAL

## 2024-01-17 VITALS
HEART RATE: 81 BPM | WEIGHT: 246 LBS | RESPIRATION RATE: 18 BRPM | BODY MASS INDEX: 35.22 KG/M2 | HEIGHT: 70 IN | OXYGEN SATURATION: 97 % | SYSTOLIC BLOOD PRESSURE: 133 MMHG | DIASTOLIC BLOOD PRESSURE: 99 MMHG | TEMPERATURE: 98.6 F

## 2024-01-17 DIAGNOSIS — R10.9 ABDOMINAL PAIN, UNSPECIFIED ABDOMINAL LOCATION: Primary | ICD-10-CM

## 2024-01-17 DIAGNOSIS — R11.2 NAUSEA AND VOMITING, UNSPECIFIED VOMITING TYPE: ICD-10-CM

## 2024-01-17 LAB
ALBUMIN SERPL-MCNC: 4.5 G/DL (ref 3.5–5.2)
ALBUMIN/GLOB SERPL: 1.1 G/DL
ALP SERPL-CCNC: 94 U/L (ref 39–117)
ALT SERPL W P-5'-P-CCNC: 19 U/L (ref 1–41)
ANION GAP SERPL CALCULATED.3IONS-SCNC: 8 MMOL/L (ref 5–15)
AST SERPL-CCNC: 20 U/L (ref 1–40)
BASOPHILS # BLD AUTO: 0.05 10*3/MM3 (ref 0–0.2)
BASOPHILS NFR BLD AUTO: 0.5 % (ref 0–1.5)
BILIRUB SERPL-MCNC: 0.7 MG/DL (ref 0–1.2)
BILIRUB UR QL STRIP: NEGATIVE
BUN SERPL-MCNC: 12 MG/DL (ref 6–20)
BUN/CREAT SERPL: 9.8 (ref 7–25)
CALCIUM SPEC-SCNC: 9.2 MG/DL (ref 8.6–10.5)
CHLORIDE SERPL-SCNC: 103 MMOL/L (ref 98–107)
CLARITY UR: CLEAR
CO2 SERPL-SCNC: 29 MMOL/L (ref 22–29)
COLOR UR: YELLOW
CREAT SERPL-MCNC: 1.22 MG/DL (ref 0.76–1.27)
DEPRECATED RDW RBC AUTO: 40.3 FL (ref 37–54)
EGFRCR SERPLBLD CKD-EPI 2021: 73.6 ML/MIN/1.73
EOSINOPHIL # BLD AUTO: 0.2 10*3/MM3 (ref 0–0.4)
EOSINOPHIL NFR BLD AUTO: 1.9 % (ref 0.3–6.2)
ERYTHROCYTE [DISTWIDTH] IN BLOOD BY AUTOMATED COUNT: 12.3 % (ref 12.3–15.4)
GLOBULIN UR ELPH-MCNC: 4 GM/DL
GLUCOSE SERPL-MCNC: 111 MG/DL (ref 65–99)
GLUCOSE UR STRIP-MCNC: NEGATIVE MG/DL
HCT VFR BLD AUTO: 52.4 % (ref 37.5–51)
HGB BLD-MCNC: 17.5 G/DL (ref 13–17.7)
HGB UR QL STRIP.AUTO: NEGATIVE
HOLD SPECIMEN: NORMAL
HOLD SPECIMEN: NORMAL
IMM GRANULOCYTES # BLD AUTO: 0.03 10*3/MM3 (ref 0–0.05)
IMM GRANULOCYTES NFR BLD AUTO: 0.3 % (ref 0–0.5)
KETONES UR QL STRIP: NEGATIVE
LEUKOCYTE ESTERASE UR QL STRIP.AUTO: NEGATIVE
LIPASE SERPL-CCNC: 26 U/L (ref 13–60)
LYMPHOCYTES # BLD AUTO: 2.18 10*3/MM3 (ref 0.7–3.1)
LYMPHOCYTES NFR BLD AUTO: 20.7 % (ref 19.6–45.3)
MCH RBC QN AUTO: 30.4 PG (ref 26.6–33)
MCHC RBC AUTO-ENTMCNC: 33.4 G/DL (ref 31.5–35.7)
MCV RBC AUTO: 91 FL (ref 79–97)
MONOCYTES # BLD AUTO: 0.93 10*3/MM3 (ref 0.1–0.9)
MONOCYTES NFR BLD AUTO: 8.8 % (ref 5–12)
NEUTROPHILS NFR BLD AUTO: 67.8 % (ref 42.7–76)
NEUTROPHILS NFR BLD AUTO: 7.14 10*3/MM3 (ref 1.7–7)
NITRITE UR QL STRIP: NEGATIVE
NRBC BLD AUTO-RTO: 0 /100 WBC (ref 0–0.2)
PH UR STRIP.AUTO: 5.5 [PH] (ref 5–8)
PLATELET # BLD AUTO: 238 10*3/MM3 (ref 140–450)
PMV BLD AUTO: 10.2 FL (ref 6–12)
POTASSIUM SERPL-SCNC: 4.1 MMOL/L (ref 3.5–5.2)
PROT SERPL-MCNC: 8.5 G/DL (ref 6–8.5)
PROT UR QL STRIP: NEGATIVE
RBC # BLD AUTO: 5.76 10*6/MM3 (ref 4.14–5.8)
SODIUM SERPL-SCNC: 140 MMOL/L (ref 136–145)
SP GR UR STRIP: >1.03 (ref 1–1.03)
UROBILINOGEN UR QL STRIP: ABNORMAL
WBC NRBC COR # BLD AUTO: 10.53 10*3/MM3 (ref 3.4–10.8)
WHOLE BLOOD HOLD COAG: NORMAL
WHOLE BLOOD HOLD SPECIMEN: NORMAL

## 2024-01-17 PROCEDURE — 99285 EMERGENCY DEPT VISIT HI MDM: CPT

## 2024-01-17 PROCEDURE — 80053 COMPREHEN METABOLIC PANEL: CPT | Performed by: PHYSICIAN ASSISTANT

## 2024-01-17 PROCEDURE — 25810000003 SODIUM CHLORIDE 0.9 % SOLUTION: Performed by: PHYSICIAN ASSISTANT

## 2024-01-17 PROCEDURE — 25510000001 IOPAMIDOL 61 % SOLUTION: Performed by: EMERGENCY MEDICINE

## 2024-01-17 PROCEDURE — 96374 THER/PROPH/DIAG INJ IV PUSH: CPT

## 2024-01-17 PROCEDURE — 85025 COMPLETE CBC W/AUTO DIFF WBC: CPT | Performed by: PHYSICIAN ASSISTANT

## 2024-01-17 PROCEDURE — 81003 URINALYSIS AUTO W/O SCOPE: CPT | Performed by: PHYSICIAN ASSISTANT

## 2024-01-17 PROCEDURE — 25010000002 MORPHINE PER 10 MG: Performed by: EMERGENCY MEDICINE

## 2024-01-17 PROCEDURE — 96375 TX/PRO/DX INJ NEW DRUG ADDON: CPT

## 2024-01-17 PROCEDURE — 83690 ASSAY OF LIPASE: CPT | Performed by: PHYSICIAN ASSISTANT

## 2024-01-17 PROCEDURE — 74177 CT ABD & PELVIS W/CONTRAST: CPT | Performed by: RADIOLOGY

## 2024-01-17 PROCEDURE — 74177 CT ABD & PELVIS W/CONTRAST: CPT

## 2024-01-17 PROCEDURE — 25010000002 ONDANSETRON PER 1 MG: Performed by: EMERGENCY MEDICINE

## 2024-01-17 RX ORDER — DICYCLOMINE HYDROCHLORIDE 10 MG/1
20 CAPSULE ORAL ONCE
Status: COMPLETED | OUTPATIENT
Start: 2024-01-17 | End: 2024-01-17

## 2024-01-17 RX ORDER — SODIUM CHLORIDE 0.9 % (FLUSH) 0.9 %
10 SYRINGE (ML) INJECTION AS NEEDED
Status: DISCONTINUED | OUTPATIENT
Start: 2024-01-17 | End: 2024-01-17 | Stop reason: HOSPADM

## 2024-01-17 RX ORDER — ONDANSETRON 4 MG/1
4 TABLET, ORALLY DISINTEGRATING ORAL EVERY 6 HOURS PRN
Qty: 12 TABLET | Refills: 0 | Status: SHIPPED | OUTPATIENT
Start: 2024-01-17

## 2024-01-17 RX ORDER — DICYCLOMINE HCL 20 MG
20 TABLET ORAL EVERY 6 HOURS PRN
Qty: 20 TABLET | Refills: 0 | Status: SHIPPED | OUTPATIENT
Start: 2024-01-17

## 2024-01-17 RX ORDER — ONDANSETRON 2 MG/ML
4 INJECTION INTRAMUSCULAR; INTRAVENOUS ONCE
Status: COMPLETED | OUTPATIENT
Start: 2024-01-17 | End: 2024-01-17

## 2024-01-17 RX ORDER — ONDANSETRON 2 MG/ML
8 INJECTION INTRAMUSCULAR; INTRAVENOUS ONCE
Status: DISCONTINUED | OUTPATIENT
Start: 2024-01-17 | End: 2024-01-17

## 2024-01-17 RX ADMIN — SODIUM CHLORIDE 1000 ML: 9 INJECTION, SOLUTION INTRAVENOUS at 16:06

## 2024-01-17 RX ADMIN — ONDANSETRON 4 MG: 2 INJECTION INTRAMUSCULAR; INTRAVENOUS at 16:49

## 2024-01-17 RX ADMIN — IOPAMIDOL 80 ML: 612 INJECTION, SOLUTION INTRAVENOUS at 16:57

## 2024-01-17 RX ADMIN — DICYCLOMINE HYDROCHLORIDE 20 MG: 10 CAPSULE ORAL at 17:55

## 2024-01-17 RX ADMIN — MORPHINE SULFATE 4 MG: 4 INJECTION, SOLUTION INTRAMUSCULAR; INTRAVENOUS at 16:46

## 2024-01-17 NOTE — ED PROVIDER NOTES
Subjective   History of Present Illness  48-year-old male who presents to the emergency department with chief complaint generalized abdominal pain.  Patient describes pain as aching, cramping pain.  Patient has had associated nausea vomiting.  Patient was seen by PCP told to come to the emergency department for further evaluation and treatment.    History provided by:  Patient   used: No    Abdominal Pain  Pain location:  Generalized  Pain quality: aching and cramping    Pain radiates to:  Does not radiate  Pain severity:  Moderate  Onset quality:  Gradual  Duration:  2 days  Timing:  Intermittent  Progression:  Worsening  Chronicity:  New  Context: not awakening from sleep, not diet changes, not laxative use, not previous surgeries, not recent illness, not recent sexual activity, not recent travel, not sick contacts and not suspicious food intake    Relieved by:  Nothing  Worsened by:  Nothing  Ineffective treatments:  None tried  Associated symptoms: chills, fatigue, nausea and vomiting    Associated symptoms: no flatus, no hematemesis, no hematochezia, no vaginal bleeding and no vaginal discharge    Risk factors: no alcohol abuse, no aspirin use, not elderly, has not had multiple surgeries, no NSAID use, not pregnant and no recent hospitalization        Review of Systems   Constitutional:  Positive for chills and fatigue.   Eyes:  Negative for photophobia, pain, redness and itching.   Gastrointestinal:  Positive for abdominal distention, abdominal pain, nausea and vomiting. Negative for flatus, hematemesis and hematochezia.   Endocrine: Negative for heat intolerance, polydipsia and polyuria.   Genitourinary:  Negative for vaginal bleeding and vaginal discharge.   All other systems reviewed and are negative.      Past Medical History:   Diagnosis Date    Erectile dysfunction     High blood pressure        Allergies   Allergen Reactions    Prevacid [Lansoprazole] Rash       Past Surgical  History:   Procedure Laterality Date    CHOLECYSTECTOMY      TONSILLECTOMY         Family History   Problem Relation Age of Onset    Heart disease Father     Hypertension Father     Hypertension Mother     Heart disease Mother        Social History     Socioeconomic History    Marital status:    Tobacco Use    Smoking status: Never    Smokeless tobacco: Never   Substance and Sexual Activity    Alcohol use: Not Currently    Drug use: Never    Sexual activity: Yes     Partners: Female     Birth control/protection: Same-sex partner           Objective   Physical Exam  Vitals and nursing note reviewed.   Constitutional:       General: He is not in acute distress.     Appearance: Normal appearance. He is well-developed and normal weight. He is not ill-appearing or toxic-appearing.   HENT:      Head: Normocephalic and atraumatic.      Nose: Nose normal.      Mouth/Throat:      Mouth: Mucous membranes are moist.      Pharynx: Oropharynx is clear. No pharyngeal swelling or oropharyngeal exudate.   Eyes:      General: No scleral icterus.     Extraocular Movements: Extraocular movements intact.      Conjunctiva/sclera: Conjunctivae normal.      Pupils: Pupils are equal, round, and reactive to light.   Cardiovascular:      Rate and Rhythm: Normal rate and regular rhythm.      Heart sounds: Normal heart sounds. No murmur heard.     No gallop.   Pulmonary:      Effort: Pulmonary effort is normal. No respiratory distress.      Breath sounds: Normal breath sounds. No stridor. No rhonchi or rales.   Abdominal:      General: Abdomen is flat. Bowel sounds are normal. There is no distension. There are no signs of injury.      Palpations: Abdomen is soft. There is no shifting dullness, fluid wave, hepatomegaly, splenomegaly or mass.      Tenderness: There is generalized abdominal tenderness. There is guarding and rebound.      Hernia: There is no hernia in the umbilical area, ventral area, left inguinal area, right femoral area  or right inguinal area.   Genitourinary:     Testes:         Right: Mass, tenderness or swelling not present.         Left: Mass or tenderness not present.   Musculoskeletal:         General: Normal range of motion.      Cervical back: Normal range of motion and neck supple.   Skin:     General: Skin is warm and dry.      Capillary Refill: Capillary refill takes less than 2 seconds.      Coloration: Skin is not cyanotic, jaundiced, mottled or pale.   Neurological:      General: No focal deficit present.      Mental Status: He is alert and oriented to person, place, and time. He is disoriented.      Cranial Nerves: No cranial nerve deficit.      Motor: No weakness.   Psychiatric:         Behavior: Behavior normal.         Thought Content: Thought content normal.         Judgment: Judgment normal.         Procedures           ED Course  ED Course as of 01/17/24 1752 Wed Jan 17, 2024   1732 IMPRESSION:     1. Pelvic kidney on the left, probably duplicated but cannot exclude  mass on the superior pole of the left pelvic kidney.     2. Other incidental findings as discussed above.      [BH]      ED Course User Index  [] Moshe [place] PA-C                                             Medical Decision Making  Amount and/or Complexity of Data Reviewed  Labs: ordered.  Radiology: ordered.    Risk  Prescription drug management.        Final diagnoses:   Abdominal pain, unspecified abdominal location   Nausea and vomiting, unspecified vomiting type       ED Disposition  ED Disposition       ED Disposition   Discharge    Condition   Stable    Comment   --               Wojciech Juarez MD  [address] [facility] [address]  [place] [address]  [phone]    Call in 1 day      Sheri Bran MD  [address] [place] [id number] [phone]    Call in 1 day      Gerhard Smith, DO  [address] [facility] [address] [place] [address]  [phone]    Call in 1 day           Medication List        New Prescriptions      dicyclomine 20 MG  tablet  Commonly known as: BENTYL  Take 1 tablet by mouth Every 6 (Six) Hours As Needed for Abdominal Cramping.     ondansetron ODT 4 MG disintegrating tablet  Commonly known as: ZOFRAN-ODT  Place 1 tablet on the tongue Every 6 (Six) Hours As Needed for Nausea or Vomiting.               Where to Get Your Medications        These medications were sent to [facility] [place]  [place], [place] - [address] [place] [address] - [phone] [facility] [phone] [address] [address]      Phone: [phone]   dicyclomine 20 MG tablet  ondansetron ODT 4 MG disintegrating tablet            Moshe [place] PA-C  01/17/24 4172

## 2024-01-25 ENCOUNTER — OFFICE VISIT (OUTPATIENT)
Dept: UROLOGY | Facility: CLINIC | Age: 49
End: 2024-01-25
Payer: COMMERCIAL

## 2024-01-25 VITALS
BODY MASS INDEX: 35.22 KG/M2 | HEIGHT: 70 IN | DIASTOLIC BLOOD PRESSURE: 106 MMHG | SYSTOLIC BLOOD PRESSURE: 137 MMHG | HEART RATE: 96 BPM | WEIGHT: 246 LBS

## 2024-01-25 DIAGNOSIS — Q63.2 PELVIC KIDNEY: ICD-10-CM

## 2024-01-25 DIAGNOSIS — N40.0 BENIGN PROSTATIC HYPERPLASIA WITHOUT LOWER URINARY TRACT SYMPTOMS: Primary | ICD-10-CM

## 2024-01-25 LAB — PSA SERPL-MCNC: 0.54 NG/ML (ref 0–4)

## 2024-01-25 PROCEDURE — 84153 ASSAY OF PSA TOTAL: CPT | Performed by: UROLOGY

## 2024-01-25 RX ORDER — TADALAFIL 5 MG/1
5 TABLET ORAL DAILY PRN
Qty: 90 TABLET | Refills: 6 | Status: SHIPPED | OUTPATIENT
Start: 2024-01-25

## 2024-01-25 NOTE — PROGRESS NOTES
Chief Complaint:      Chief Complaint   Patient presents with    Benign Prostatic Hypertrophy     Yearly        HPI:   48 y.o. male returns today he is doing great no pain no nausea no vomiting.  I have a PSA pending, to refill Cialis I am going to check an MRI of the abdomen because of the pelvic kidney and the questionable mass I will follow-up with him based on this    Past Medical History:     Past Medical History:   Diagnosis Date    Benign prostatic hyperplasia without lower urinary tract symptoms 05/25/2022    Erectile dysfunction     High blood pressure        Current Meds:     Current Outpatient Medications   Medication Sig Dispense Refill    atorvastatin (LIPITOR) 10 MG tablet       dicyclomine (BENTYL) 20 MG tablet Take 1 tablet by mouth Every 6 (Six) Hours As Needed for Abdominal Cramping. 20 tablet 0    levoFLOXacin (LEVAQUIN) 500 MG tablet       lisinopril (PRINIVIL,ZESTRIL) 20 MG tablet 1 tablet.      ondansetron ODT (ZOFRAN-ODT) 4 MG disintegrating tablet Place 1 tablet on the tongue Every 6 (Six) Hours As Needed for Nausea or Vomiting. 12 tablet 0    tadalafil (Cialis) 20 MG tablet Take 1 tablet by mouth As Needed for Erectile Dysfunction. 20 tablet 6    tadalafil (Cialis) 5 MG tablet Take 1 tablet by mouth Daily. Take one Daily 30 tablet 6    tamsulosin (Flomax) 0.4 MG capsule 24 hr capsule Take 1 capsule by mouth Every Night. 90 capsule 5     No current facility-administered medications for this visit.        Allergies:      Allergies   Allergen Reactions    Prevacid [Lansoprazole] Rash        Past Surgical History:     Past Surgical History:   Procedure Laterality Date    CHOLECYSTECTOMY      TONSILLECTOMY         Social History:     Social History     Socioeconomic History    Marital status:    Tobacco Use    Smoking status: Never    Smokeless tobacco: Never   Vaping Use    Vaping Use: Never used   Substance and Sexual Activity    Alcohol use: Not Currently    Drug use: Never    Sexual  activity: Yes     Partners: Female     Birth control/protection: Same-sex partner       Family History:     Family History   Problem Relation Age of Onset    Heart disease Father     Hypertension Father     Hypertension Mother     Heart disease Mother        Review of Systems:     Review of Systems   Constitutional: Negative.    HENT: Negative.     Eyes: Negative.    Respiratory: Negative.     Cardiovascular: Negative.    Gastrointestinal: Negative.    Endocrine: Negative.    Musculoskeletal: Negative.    Allergic/Immunologic: Negative.    Neurological: Negative.    Hematological: Negative.    Psychiatric/Behavioral: Negative.         Physical Exam:     Physical Exam  Vitals and nursing note reviewed.   Constitutional:       Appearance: He is well-developed.   HENT:      Head: Normocephalic and atraumatic.   Eyes:      Conjunctiva/sclera: Conjunctivae normal.      Pupils: Pupils are equal, round, and reactive to light.   Cardiovascular:      Rate and Rhythm: Normal rate and regular rhythm.      Heart sounds: Normal heart sounds.   Pulmonary:      Effort: Pulmonary effort is normal.      Breath sounds: Normal breath sounds.   Abdominal:      General: Bowel sounds are normal.      Palpations: Abdomen is soft.   Musculoskeletal:         General: Normal range of motion.      Cervical back: Normal range of motion.   Skin:     General: Skin is warm and dry.   Neurological:      Mental Status: He is alert and oriented to person, place, and time.      Deep Tendon Reflexes: Reflexes are normal and symmetric.   Psychiatric:         Behavior: Behavior normal.         Thought Content: Thought content normal.         Judgment: Judgment normal.         I have reviewed the following portions of the patient's history: Allergies, current medications, past family history, past medical history, past social history, past surgical history, problem list, and ROS and confirm it is accurate.    Recent Image (CT and/or KUB):      CT Abdomen  and Pelvis: No results found for this or any previous visit.       CT Stone Protocol: No results found for this or any previous visit.       KUB: No results found for this or any previous visit.       Labs (past 3 months):      Admission on 01/17/2024, Discharged on 01/17/2024   Component Date Value Ref Range Status    Glucose 01/17/2024 111 (H)  65 - 99 mg/dL Final    BUN 01/17/2024 12  6 - 20 mg/dL Final    Creatinine 01/17/2024 1.22  0.76 - 1.27 mg/dL Final    Sodium 01/17/2024 140  136 - 145 mmol/L Final    Potassium 01/17/2024 4.1  3.5 - 5.2 mmol/L Final    Chloride 01/17/2024 103  98 - 107 mmol/L Final    CO2 01/17/2024 29.0  22.0 - 29.0 mmol/L Final    Calcium 01/17/2024 9.2  8.6 - 10.5 mg/dL Final    Total Protein 01/17/2024 8.5  6.0 - 8.5 g/dL Final    Albumin 01/17/2024 4.5  3.5 - 5.2 g/dL Final    ALT (SGPT) 01/17/2024 19  1 - 41 U/L Final    AST (SGOT) 01/17/2024 20  1 - 40 U/L Final    Alkaline Phosphatase 01/17/2024 94  39 - 117 U/L Final    Total Bilirubin 01/17/2024 0.7  0.0 - 1.2 mg/dL Final    Globulin 01/17/2024 4.0  gm/dL Final    A/G Ratio 01/17/2024 1.1  g/dL Final    BUN/Creatinine Ratio 01/17/2024 9.8  7.0 - 25.0 Final    Anion Gap 01/17/2024 8.0  5.0 - 15.0 mmol/L Final    eGFR 01/17/2024 73.6  >60.0 mL/min/1.73 Final    Lipase 01/17/2024 26  13 - 60 U/L Final    Color, UA 01/17/2024 Yellow  Yellow, Straw Final    Appearance, UA 01/17/2024 Clear  Clear Final    pH, UA 01/17/2024 5.5  5.0 - 8.0 Final    Specific Gravity, UA 01/17/2024 >1.030 (H)  1.005 - 1.030 Final    Glucose, UA 01/17/2024 Negative  Negative Final    Ketones, UA 01/17/2024 Negative  Negative Final    Bilirubin, UA 01/17/2024 Negative  Negative Final    Blood, UA 01/17/2024 Negative  Negative Final    Protein, UA 01/17/2024 Negative  Negative Final    Leuk Esterase, UA 01/17/2024 Negative  Negative Final    Nitrite, UA 01/17/2024 Negative  Negative Final    Urobilinogen, UA 01/17/2024 0.2 E.U./dL  0.2 - 1.0 E.U./dL Final     Extra Tube 01/17/2024 Hold for add-ons.   Final    Auto resulted.    Extra Tube 01/17/2024 hold for add-on   Final    Auto resulted    Extra Tube 01/17/2024 Hold for add-ons.   Final    Auto resulted.    Extra Tube 01/17/2024 Hold for add-ons.   Final    Auto resulted    WBC 01/17/2024 10.53  3.40 - 10.80 10*3/mm3 Final    RBC 01/17/2024 5.76  4.14 - 5.80 10*6/mm3 Final    Hemoglobin 01/17/2024 17.5  13.0 - 17.7 g/dL Final    Hematocrit 01/17/2024 52.4 (H)  37.5 - 51.0 % Final    MCV 01/17/2024 91.0  79.0 - 97.0 fL Final    MCH 01/17/2024 30.4  26.6 - 33.0 pg Final    MCHC 01/17/2024 33.4  31.5 - 35.7 g/dL Final    RDW 01/17/2024 12.3  12.3 - 15.4 % Final    RDW-SD 01/17/2024 40.3  37.0 - 54.0 fl Final    MPV 01/17/2024 10.2  6.0 - 12.0 fL Final    Platelets 01/17/2024 238  140 - 450 10*3/mm3 Final    Neutrophil % 01/17/2024 67.8  42.7 - 76.0 % Final    Lymphocyte % 01/17/2024 20.7  19.6 - 45.3 % Final    Monocyte % 01/17/2024 8.8  5.0 - 12.0 % Final    Eosinophil % 01/17/2024 1.9  0.3 - 6.2 % Final    Basophil % 01/17/2024 0.5  0.0 - 1.5 % Final    Immature Grans % 01/17/2024 0.3  0.0 - 0.5 % Final    Neutrophils, Absolute 01/17/2024 7.14 (H)  1.70 - 7.00 10*3/mm3 Final    Lymphocytes, Absolute 01/17/2024 2.18  0.70 - 3.10 10*3/mm3 Final    Monocytes, Absolute 01/17/2024 0.93 (H)  0.10 - 0.90 10*3/mm3 Final    Eosinophils, Absolute 01/17/2024 0.20  0.00 - 0.40 10*3/mm3 Final    Basophils, Absolute 01/17/2024 0.05  0.00 - 0.20 10*3/mm3 Final    Immature Grans, Absolute 01/17/2024 0.03  0.00 - 0.05 10*3/mm3 Final    nRBC 01/17/2024 0.0  0.0 - 0.2 /100 WBC Final        Procedure:       Assessment/Plan:   Pelvic kidney with questionable mass will get MRI  BPH: Discussed the pathophysiology of BPH and obstruction.  We discussed the static and dynamic effects of BPH as well as using 5 alpha reductase inhibitors versus alpha blockade.  We discussed the indications for transurethral surgery as well and/ or other  therapeutic options available including all of the newer techniques.  Continue daily Cialis  PSA testing-I am recommending a PSA blood test that stands for prostate specific antigen.  I discussed the pathophysiology of PSA testing indicating its use in the diagnosis and management of prostate cancer.  I discussed the normal range being 0 to 4, but more appropriately being much closer to 0 to 2 in a normal male.  I discussed the fact that after a certain age we don't recommend PSA testing especially in view of numerous comorbidities, that this will not be a useful test.  I discussed many of the things that can artificially raise PSA including a recent infection, urinary tract infection, and recent sexual intercourse, or even the type of movement such as manipulation of the prostate from riding a bicycle.  After all this is taken into account when the test is reviewed, the most important use of PSA is the velocity measurement.  In other words, the change of PSA with time is a very important factor in the use and that we look for greater than 20% rise over a year to help us make the prediction of prostate cancer.  I also discussed that the use with prostate cancer indicating that after a radical prostatectomy, the PSA should be 0 and any rise indicates an early biochemical recurrence.            This document has been electronically signed by SHAHIDA TALBOT MD January 25, 2024 10:12 EST    Dictated Utilizing Dragon Dictation: Part of this note may be an electronic transcription/translation of spoken language to printed text using the Dragon Dictation System.

## 2024-02-02 ENCOUNTER — HOSPITAL ENCOUNTER (OUTPATIENT)
Dept: MRI IMAGING | Facility: HOSPITAL | Age: 49
Discharge: HOME | End: 2024-02-02
Admitting: UROLOGY
Payer: COMMERCIAL

## 2024-02-02 DIAGNOSIS — N40.0 BENIGN PROSTATIC HYPERPLASIA WITHOUT LOWER URINARY TRACT SYMPTOMS: ICD-10-CM

## 2024-02-02 PROCEDURE — 74181 MRI ABDOMEN W/O CONTRAST: CPT

## 2024-02-05 ENCOUNTER — TELEPHONE (OUTPATIENT)
Dept: GASTROENTEROLOGY | Facility: CLINIC | Age: 49
End: 2024-02-05
Payer: COMMERCIAL

## 2024-02-05 NOTE — TELEPHONE ENCOUNTER
Routing to Ann. It was done several days ago but not the radiologist report. Please advise and I will call the pt. Dr. Juarez responded to me and said that it is not ready as of yet. I called the pt and let him know.

## 2024-02-08 ENCOUNTER — TELEPHONE (OUTPATIENT)
Dept: GASTROENTEROLOGY | Facility: CLINIC | Age: 49
End: 2024-02-08
Payer: COMMERCIAL

## 2024-02-08 NOTE — TELEPHONE ENCOUNTER
Routing to Dr. Juarez to see if he can look at this for me and I can advise the pt- Ann said it take 5 days after for the MRI to be read and that we will call when it is done. I called the pt and let him know.      I called the pt and let him know barbara the MRI said.

## 2024-02-11 PROBLEM — Q63.2 PELVIC KIDNEY: Status: ACTIVE | Noted: 2024-02-11

## 2024-02-13 ENCOUNTER — TELEPHONE (OUTPATIENT)
Dept: UROLOGY | Facility: CLINIC | Age: 49
End: 2024-02-13
Payer: COMMERCIAL

## 2024-02-15 ENCOUNTER — OFFICE VISIT (OUTPATIENT)
Dept: UROLOGY | Facility: CLINIC | Age: 49
End: 2024-02-15
Payer: COMMERCIAL

## 2024-02-15 VITALS
HEIGHT: 70 IN | BODY MASS INDEX: 36.71 KG/M2 | SYSTOLIC BLOOD PRESSURE: 157 MMHG | DIASTOLIC BLOOD PRESSURE: 109 MMHG | WEIGHT: 256.4 LBS | HEART RATE: 90 BPM

## 2024-02-15 DIAGNOSIS — R79.89 LOW TESTOSTERONE IN MALE: Primary | ICD-10-CM

## 2024-02-15 DIAGNOSIS — E34.8 HYPERESTROGENISM IN MALE: ICD-10-CM

## 2024-02-15 DIAGNOSIS — Q63.2 PELVIC KIDNEY: ICD-10-CM

## 2024-02-15 PROCEDURE — 82670 ASSAY OF TOTAL ESTRADIOL: CPT

## 2024-02-15 PROCEDURE — 85027 COMPLETE CBC AUTOMATED: CPT

## 2024-02-15 PROCEDURE — 84403 ASSAY OF TOTAL TESTOSTERONE: CPT

## 2024-02-16 ENCOUNTER — TELEPHONE (OUTPATIENT)
Dept: UROLOGY | Facility: CLINIC | Age: 49
End: 2024-02-16
Payer: COMMERCIAL

## 2024-02-16 LAB
DEPRECATED RDW RBC AUTO: 38.7 FL (ref 37–54)
ERYTHROCYTE [DISTWIDTH] IN BLOOD BY AUTOMATED COUNT: 12.2 % (ref 12.3–15.4)
ESTRADIOL SERPL HS-MCNC: 47.5 PG/ML
HCT VFR BLD AUTO: 45.8 % (ref 37.5–51)
HGB BLD-MCNC: 15.9 G/DL (ref 13–17.7)
MCH RBC QN AUTO: 30.5 PG (ref 26.6–33)
MCHC RBC AUTO-ENTMCNC: 34.7 G/DL (ref 31.5–35.7)
MCV RBC AUTO: 87.9 FL (ref 79–97)
PLATELET # BLD AUTO: 201 10*3/MM3 (ref 140–450)
PMV BLD AUTO: 11.6 FL (ref 6–12)
RBC # BLD AUTO: 5.21 10*6/MM3 (ref 4.14–5.8)
TESTOST SERPL-MCNC: 289 NG/DL (ref 249–836)
WBC NRBC COR # BLD AUTO: 7.18 10*3/MM3 (ref 3.4–10.8)

## 2024-02-19 ENCOUNTER — TELEPHONE (OUTPATIENT)
Dept: UROLOGY | Facility: CLINIC | Age: 49
End: 2024-02-19
Payer: COMMERCIAL

## 2024-02-20 DIAGNOSIS — R79.89 LOW TESTOSTERONE IN MALE: Primary | ICD-10-CM

## 2024-02-20 RX ORDER — TESTOSTERONE CYPIONATE 200 MG/ML
INJECTION, SOLUTION INTRAMUSCULAR
Qty: 10 ML | Refills: 2 | Status: SHIPPED | OUTPATIENT
Start: 2024-02-20

## 2024-02-21 ENCOUNTER — TELEPHONE (OUTPATIENT)
Dept: UROLOGY | Facility: CLINIC | Age: 49
End: 2024-02-21
Payer: COMMERCIAL

## 2024-02-22 ENCOUNTER — LAB (OUTPATIENT)
Dept: UROLOGY | Facility: CLINIC | Age: 49
End: 2024-02-22
Payer: COMMERCIAL

## 2024-02-28 ENCOUNTER — TELEPHONE (OUTPATIENT)
Dept: UROLOGY | Facility: CLINIC | Age: 49
End: 2024-02-28
Payer: COMMERCIAL

## 2024-02-28 NOTE — TELEPHONE ENCOUNTER
"PA for Tadalafil 5mg has been sent to insurance company and DENIED!!                      Deniedon February 21  Your PA request has been denied. Additional information will be provided in the denial communication. It is a plan exclusion. The health plan benefit states that prescription drugs not on the drug formulary are excluded from coverage. We reviewed the information we received and it did not allow us to approve an exception to this plan exclusion.?The list of covered medications on the formulary can be found at www.[employer] by navigating the site to the \"Pharmacy Benefits\" section of the home page, then select the appropriate plan to view the formulary.  Drug  Tadalafil 5MG tablets  Form  [employer] PA Form (2017 [employer])  Original Claim Info  70 [employer] Not Covered  "

## 2025-02-12 DIAGNOSIS — N40.0 BENIGN PROSTATIC HYPERPLASIA WITHOUT LOWER URINARY TRACT SYMPTOMS: ICD-10-CM

## 2025-02-13 RX ORDER — TADALAFIL 20 MG/1
20 TABLET ORAL AS NEEDED
Qty: 20 TABLET | Refills: 0 | Status: SHIPPED | OUTPATIENT
Start: 2025-02-13

## 2025-02-13 RX ORDER — TADALAFIL 5 MG/1
5 TABLET ORAL DAILY PRN
Qty: 30 TABLET | Refills: 0 | Status: SHIPPED | OUTPATIENT
Start: 2025-02-13

## 2025-04-15 ENCOUNTER — OFFICE VISIT (OUTPATIENT)
Dept: UROLOGY | Facility: CLINIC | Age: 50
End: 2025-04-15
Payer: COMMERCIAL

## 2025-04-15 ENCOUNTER — TELEPHONE (OUTPATIENT)
Dept: UROLOGY | Facility: CLINIC | Age: 50
End: 2025-04-15

## 2025-04-15 VITALS
HEART RATE: 79 BPM | DIASTOLIC BLOOD PRESSURE: 85 MMHG | WEIGHT: 263 LBS | HEIGHT: 70 IN | SYSTOLIC BLOOD PRESSURE: 133 MMHG | BODY MASS INDEX: 37.65 KG/M2

## 2025-04-15 DIAGNOSIS — R79.89 LOW TESTOSTERONE IN MALE: ICD-10-CM

## 2025-04-15 DIAGNOSIS — N40.0 BENIGN PROSTATIC HYPERPLASIA WITHOUT LOWER URINARY TRACT SYMPTOMS: ICD-10-CM

## 2025-04-15 PROCEDURE — 99213 OFFICE O/P EST LOW 20 MIN: CPT | Performed by: UROLOGY

## 2025-04-15 RX ORDER — TAMSULOSIN HYDROCHLORIDE 0.4 MG/1
1 CAPSULE ORAL NIGHTLY
Qty: 90 CAPSULE | Refills: 5 | Status: SHIPPED | OUTPATIENT
Start: 2025-04-15

## 2025-04-15 RX ORDER — TADALAFIL 20 MG/1
20 TABLET ORAL AS NEEDED
Qty: 30 TABLET | Refills: 6 | Status: SHIPPED | OUTPATIENT
Start: 2025-04-15

## 2025-04-15 RX ORDER — TADALAFIL 5 MG/1
5 TABLET ORAL DAILY PRN
Qty: 90 TABLET | Refills: 3 | Status: SHIPPED | OUTPATIENT
Start: 2025-04-15

## 2025-04-15 RX ORDER — TESTOSTERONE CYPIONATE 200 MG/ML
INJECTION, SOLUTION INTRAMUSCULAR
Qty: 10 ML | Refills: 2 | Status: SHIPPED | OUTPATIENT
Start: 2025-04-15

## 2025-04-15 NOTE — TELEPHONE ENCOUNTER
Caller: DAVIS LUNA    Relationship to patient: SELF    Best call back number: [phone]    Chief complaint: PATIENT HAS AN APPOINTMENT . HE IS IN [place]OWN FOR WORK AND WONDERING IF HE CAN BE SEEN EARLY. PLEASE REACH OUT ON CELL.    HUB AGENT UNABLE TO WARM TRANSFER    Type of visit: FOLLOW UP

## 2025-04-15 NOTE — PROGRESS NOTES
"Chief Complaint:      Chief Complaint   Patient presents with    Benign prostatic hyperplasia without lower urinary tract sy       HPI:   50 y.o. male here for yearly checkup wants to restart testosterone certainly reasonable I refilled his tadalafil and testosterone.  I will see him back in October    Past Medical History:     Past Medical History:   Diagnosis Date    Benign prostatic hyperplasia without lower urinary tract symptoms 05/25/2022    Erectile dysfunction     High blood pressure        Current Meds:     Current Outpatient Medications   Medication Sig Dispense Refill    atorvastatin (LIPITOR) 10 MG tablet       dicyclomine (BENTYL) 20 MG tablet Take 1 tablet by mouth Every 6 (Six) Hours As Needed for Abdominal Cramping. 20 tablet 0    levoFLOXacin (LEVAQUIN) 500 MG tablet       lisinopril (PRINIVIL,ZESTRIL) 20 MG tablet 1 tablet.      ondansetron ODT (ZOFRAN-ODT) 4 MG disintegrating tablet Place 1 tablet on the tongue Every 6 (Six) Hours As Needed for Nausea or Vomiting. 12 tablet 0    Syringe 25G X 5/8\" 3 ML misc Use as directed 2 x weekly 24 each 3    tadalafil (CIALIS) 20 MG tablet TAKE ONE TABLET BY MOUTH AS NEEDED FOR ERECTILE DYSFUNCTION 20 tablet 0    tadalafil (Cialis) 5 MG tablet Take 1 tablet by mouth Daily. Take one Daily 30 tablet 6    tadalafil (Cialis) 5 MG tablet Take 1 tablet by mouth Daily As Needed for Erectile Dysfunction. Take one Daily 90 tablet 6    tadalafil (CIALIS) 5 MG tablet take one tablet by mouth every day as needed for erectile dysfunction 30 tablet 0    tamsulosin (Flomax) 0.4 MG capsule 24 hr capsule Take 1 capsule by mouth Every Night. 90 capsule 5    Testosterone Cypionate (Depo-Testosterone) 200 MG/ML injection Inject 1/2 cc subcutaneously every Monday and Thursday 10 mL 2     No current facility-administered medications for this visit.        Allergies:      Allergies   Allergen Reactions    Prevacid [Lansoprazole] Rash        Past Surgical History:     Past Surgical " History:   Procedure Laterality Date    CHOLECYSTECTOMY      TONSILLECTOMY         Social History:     Social History     Socioeconomic History    Marital status:    Tobacco Use    Smoking status: Never    Smokeless tobacco: Never   Vaping Use    Vaping status: Never Used   Substance and Sexual Activity    Alcohol use: Not Currently    Drug use: Never    Sexual activity: Yes     Partners: Female     Birth control/protection: Partner of same sex       Family History:     Family History   Problem Relation Age of Onset    Heart disease Father     Hypertension Father     Hypertension Mother     Heart disease Mother        Review of Systems:     Review of Systems   Constitutional: Negative.    HENT: Negative.     Eyes: Negative.    Respiratory: Negative.     Cardiovascular: Negative.    Gastrointestinal: Negative.    Endocrine: Negative.    Musculoskeletal: Negative.    Allergic/Immunologic: Negative.    Neurological: Negative.    Hematological: Negative.    Psychiatric/Behavioral: Negative.         Physical Exam:     Physical Exam  Vitals and nursing note reviewed.   Constitutional:       Appearance: He is well-developed.   HENT:      Head: Normocephalic and atraumatic.   Eyes:      Conjunctiva/sclera: Conjunctivae normal.      Pupils: Pupils are equal, round, and reactive to light.   Cardiovascular:      Rate and Rhythm: Normal rate and regular rhythm.      Heart sounds: Normal heart sounds.   Pulmonary:      Effort: Pulmonary effort is normal.      Breath sounds: Normal breath sounds.   Abdominal:      General: Bowel sounds are normal.      Palpations: Abdomen is soft.   Musculoskeletal:         General: Normal range of motion.      Cervical back: Normal range of motion.   Skin:     General: Skin is warm and dry.   Neurological:      Mental Status: He is alert and oriented to person, place, and time.      Deep Tendon Reflexes: Reflexes are normal and symmetric.   Psychiatric:         Behavior: Behavior normal.          Thought Content: Thought content normal.         Judgment: Judgment normal.         I have reviewed the following portions of the patient's history: Allergies, current medications, past family history, past medical history, past social history, past surgical history, problem list, and ROS and confirm it is accurate.    Recent Image (CT and/or KUB):      CT Abdomen and Pelvis: No results found for this or any previous visit.       CT Stone Protocol: No results found for this or any previous visit.       KUB: No results found for this or any previous visit.       Labs (past 3 months):      No visits with results within 3 Month(s) from this visit.   Latest known visit with results is:   Office Visit on 02/15/2024   Component Date Value Ref Range Status    WBC 02/15/2024 7.18  3.40 - 10.80 10*3/mm3 Final    RBC 02/15/2024 5.21  4.14 - 5.80 10*6/mm3 Final    Hemoglobin 02/15/2024 15.9  13.0 - 17.7 g/dL Final    Hematocrit 02/15/2024 45.8  37.5 - 51.0 % Final    MCV 02/15/2024 87.9  79.0 - 97.0 fL Final    MCH 02/15/2024 30.5  26.6 - 33.0 pg Final    MCHC 02/15/2024 34.7  31.5 - 35.7 g/dL Final    RDW 02/15/2024 12.2 (L)  12.3 - 15.4 % Final    RDW-SD 02/15/2024 38.7  37.0 - 54.0 fl Final    MPV 02/15/2024 11.6  6.0 - 12.0 fL Final    Platelets 02/15/2024 201  140 - 450 10*3/mm3 Final    Estradiol 02/15/2024 47.5  pg/mL Final    Testosterone, Total 02/15/2024 289.00  249.00 - 836.00 ng/dL Final        Procedure:       Assessment/Plan:   Low Testosterone: This pleasant male patient presents today with signs and symptoms that are consistent with low testosterone. He has positive Juan C questionnaire by history, this includes both the sexual and nonsexual side effects.  Sexual side effects include inability to achieve and maintain an erection, inability to maintain his erection, and decreased interest in sexual activity.  Nonsexual symptomatology includes fatigue, difficulty completing a job, and tiredness.  We had a  discussion of the various forms of testosterone available including parenteral, topical, and the form of a patch.  We discussed the efficacy of the gels and the injections, as well as the cost and benefits analysis.  We discussed the studies and talked about heart disease and its effect on prostate cancer, both of which are negligible.  He gave verbal consent to proceed with treatment.  He understands the risks and benefits of length.  He also completed his attempts at fertility. He understands the partial effect on spermatogenesis.  PSA testing-I am recommending a PSA blood test that stands for prostate specific antigen.  I discussed the pathophysiology of PSA testing indicating its use in the diagnosis and management of prostate cancer.  I discussed the normal range being 0 to 4, but more appropriately being much closer to 0 to 2 in a normal male.  I discussed the fact that after a certain age we don't recommend PSA testing especially in view of numerous comorbidities, that this will not be a useful test.  I discussed many of the things that can artificially raise PSA including a recent infection, urinary tract infection, and recent sexual intercourse, or even the type of movement such as manipulation of the prostate from riding a bicycle.  After all this is taken into account when the test is reviewed, the most important use of PSA is the velocity measurement.  In other words, the change of PSA with time is a very important factor in the use and that we look for greater than 20% rise over a year to help us make the prediction of prostate cancer.  I also discussed that the use with prostate cancer indicating that after a radical prostatectomy, the PSA should be 0 and any rise indicates an early biochemical recurrence.          This document has been electronically signed by SHAHIDA TALBOT MD April 15, 2025 12:10 EDT    Dictated Utilizing Dragon Dictation: Part of this note may be an electronic  transcription/translation of spoken language to printed text using the Dragon Dictation System.

## 2025-04-16 ENCOUNTER — TELEPHONE (OUTPATIENT)
Dept: UROLOGY | Facility: CLINIC | Age: 50
End: 2025-04-16
Payer: COMMERCIAL

## 2025-04-16 NOTE — TELEPHONE ENCOUNTER
PA for Testosterone has been sent to pt's insurance company and APPROVED!!            [facility]  Your [facility] request has been approved. Additional information will be provided in the approval communication.  Effective Date: 4/16/2025  Authorization Expiration Date: 4/15/2026  Drug  Testosterone Cypionate 200MG/ML intramuscular solution  ePA cloud logo  Form  [employer] Electronic PA Form (2017 [employer])  Original Claim Info  75 Prior Authorization Required